# Patient Record
Sex: MALE | URBAN - METROPOLITAN AREA
[De-identification: names, ages, dates, MRNs, and addresses within clinical notes are randomized per-mention and may not be internally consistent; named-entity substitution may affect disease eponyms.]

---

## 2021-01-23 ENCOUNTER — NURSE TRIAGE (OUTPATIENT)
Dept: NURSING | Facility: CLINIC | Age: 26
End: 2021-01-23

## 2021-01-23 NOTE — TELEPHONE ENCOUNTER
Pt questions where he needs to go for his second COVID vaccination, states he has this scheduled for 02/03 but does not know where to go, he believes the site changed.  He is a dental student.     RN advised pt to contact the site he went to for the first vaccination. Pt verbalized his understanding and had no further questions.     Diana Roach RN/FNA